# Patient Record
Sex: FEMALE | URBAN - METROPOLITAN AREA
[De-identification: names, ages, dates, MRNs, and addresses within clinical notes are randomized per-mention and may not be internally consistent; named-entity substitution may affect disease eponyms.]

---

## 2019-12-06 ENCOUNTER — APPOINTMENT (RX ONLY)
Dept: URBAN - METROPOLITAN AREA CLINIC 157 | Facility: CLINIC | Age: 39
Setting detail: DERMATOLOGY
End: 2019-12-06

## 2019-12-06 PROBLEM — L71.0 PERIORAL DERMATITIS: Status: ACTIVE | Noted: 2019-12-06

## 2019-12-06 PROCEDURE — ? MEDICATION COUNSELING

## 2019-12-06 PROCEDURE — ? TREATMENT REGIMEN

## 2019-12-06 PROCEDURE — 99242 OFF/OP CONSLTJ NEW/EST SF 20: CPT

## 2019-12-06 PROCEDURE — ? PRESCRIPTION

## 2019-12-06 RX ORDER — METRONIDAZOLE 7.5 MG/G
APPLY CREAM TOPICAL BID
Qty: 45 | Refills: 6 | Status: ERX | COMMUNITY
Start: 2019-12-06

## 2019-12-06 RX ADMIN — METRONIDAZOLE APPLY: 7.5 CREAM TOPICAL at 00:00

## 2019-12-06 NOTE — PROCEDURE: TREATMENT REGIMEN
Plan: Pt to call if metro cream is not working and will switch to Elidel per RR
Initiate Treatment: Minocycline 100mg QD x 30months, Metronidazole 0.75% cream BID
Detail Level: Zone

## 2019-12-30 ENCOUNTER — RX ONLY (OUTPATIENT)
Age: 39
Setting detail: RX ONLY
End: 2019-12-30

## 2019-12-30 RX ORDER — PIMECROLIMUS 10 MG/G
CREAM TOPICAL
Qty: 1 | Refills: 2 | Status: ERX | COMMUNITY
Start: 2019-12-30
